# Patient Record
Sex: FEMALE | Employment: FULL TIME | URBAN - METROPOLITAN AREA
[De-identification: names, ages, dates, MRNs, and addresses within clinical notes are randomized per-mention and may not be internally consistent; named-entity substitution may affect disease eponyms.]

---

## 2024-06-21 PROBLEM — O10.919 CHRONIC HYPERTENSION DURING PREGNANCY: Status: ACTIVE | Noted: 2024-06-21

## 2024-06-21 PROBLEM — O09.93 HIGH-RISK PREGNANCY, THIRD TRIMESTER: Status: ACTIVE | Noted: 2024-06-21

## 2024-06-24 PROBLEM — O99.719 PRURITUS OF PREGNANCY: Status: ACTIVE | Noted: 2024-06-24

## 2024-06-24 PROBLEM — L29.9 PRURITUS OF PREGNANCY: Status: ACTIVE | Noted: 2024-06-24

## 2024-06-25 ENCOUNTER — ROUTINE PRENATAL (OUTPATIENT)
Dept: OBGYN CLINIC | Age: 29
End: 2024-06-25
Payer: COMMERCIAL

## 2024-06-25 VITALS
DIASTOLIC BLOOD PRESSURE: 90 MMHG | BODY MASS INDEX: 34.16 KG/M2 | WEIGHT: 174 LBS | HEIGHT: 60 IN | SYSTOLIC BLOOD PRESSURE: 128 MMHG

## 2024-06-25 DIAGNOSIS — O10.919 CHRONIC HYPERTENSION DURING PREGNANCY: ICD-10-CM

## 2024-06-25 DIAGNOSIS — Z67.91 RH D NEGATIVE BLOOD TYPE: ICD-10-CM

## 2024-06-25 DIAGNOSIS — O99.513 ASTHMA AFFECTING PREGNANCY IN THIRD TRIMESTER: ICD-10-CM

## 2024-06-25 DIAGNOSIS — O99.213 OBESITY AFFECTING PREGNANCY IN THIRD TRIMESTER, UNSPECIFIED OBESITY TYPE: ICD-10-CM

## 2024-06-25 DIAGNOSIS — O99.713 PRURITUS OF PREGNANCY IN THIRD TRIMESTER: ICD-10-CM

## 2024-06-25 DIAGNOSIS — L29.9 PRURITUS OF PREGNANCY IN THIRD TRIMESTER: ICD-10-CM

## 2024-06-25 DIAGNOSIS — J45.909 ASTHMA AFFECTING PREGNANCY IN THIRD TRIMESTER: ICD-10-CM

## 2024-06-25 DIAGNOSIS — O09.93 HIGH-RISK PREGNANCY, THIRD TRIMESTER: Primary | ICD-10-CM

## 2024-06-25 DIAGNOSIS — Z3A.30 30 WEEKS GESTATION OF PREGNANCY: ICD-10-CM

## 2024-06-25 PROCEDURE — 99204 OFFICE O/P NEW MOD 45 MIN: CPT | Performed by: OBSTETRICS & GYNECOLOGY

## 2024-06-25 PROCEDURE — 76819 FETAL BIOPHYS PROFIL W/O NST: CPT | Performed by: OBSTETRICS & GYNECOLOGY

## 2024-06-25 PROCEDURE — 76827 ECHO EXAM OF FETAL HEART: CPT | Performed by: OBSTETRICS & GYNECOLOGY

## 2024-06-25 PROCEDURE — 76811 OB US DETAILED SNGL FETUS: CPT | Performed by: OBSTETRICS & GYNECOLOGY

## 2024-06-25 PROCEDURE — 76825 ECHO EXAM OF FETAL HEART: CPT | Performed by: OBSTETRICS & GYNECOLOGY

## 2024-06-25 PROCEDURE — 93325 DOPPLER ECHO COLOR FLOW MAPG: CPT | Performed by: OBSTETRICS & GYNECOLOGY

## 2024-06-25 ASSESSMENT — PATIENT HEALTH QUESTIONNAIRE - PHQ9
SUM OF ALL RESPONSES TO PHQ QUESTIONS 1-9: 0
SUM OF ALL RESPONSES TO PHQ9 QUESTIONS 1 & 2: 0
SUM OF ALL RESPONSES TO PHQ QUESTIONS 1-9: 0
SUM OF ALL RESPONSES TO PHQ QUESTIONS 1-9: 0
2. FEELING DOWN, DEPRESSED OR HOPELESS: NOT AT ALL
1. LITTLE INTEREST OR PLEASURE IN DOING THINGS: NOT AT ALL
SUM OF ALL RESPONSES TO PHQ QUESTIONS 1-9: 0

## 2024-06-25 NOTE — PATIENT INSTRUCTIONS
Resources for Depression/Anxiety  [employer] (PSI).    PSI Warmline:  [phone] (1495).  [email or web address].[email or web address]    Mom's IMPACTT  [email or web address]       In order to optimize maternal, fetal, and  health, we recommend the following vaccinations.   Flu- yearly ([email or web address]://[email or web address]-facts-[email or web address]-[email or web address])  Consider Covid vaccination/booster ([email or web address]://[email or web address]-[email or web address])  TDaP after 28 weeks each pregnancy ([email or web address]://www.[email or web address])  Consider RSV vaccine 32-36 weeks of pregnancy, if Sept- January.  ([email or web address].[email or web address])

## 2024-06-25 NOTE — PROGRESS NOTES
diagnosis further, approximately 11% of women with chronic hypertension have proteinuria at baseline.  Up to 20-50% of women with chronic hypertension may develop superimposed preeclampsia, an incidence five times or more than that of pregnant women without hypertension. There are currently no useful tools for predicting superimposed preeclampsia, but the risk of superimposed preeclampsia is higher in women who are , obese, smoke, have had hypertension for 4 years or more, have a diastolic blood pressure higher than 100 mm Hg at baseline, and have a history of preeclampsia.     A woman with chronic hypertension should be evaluated prepregnancy to identify possible end-organ involvement, to consider evaluation for secondary hyper tension, and for the optimization of maternal comorbidities (eg, obesity, diabetes) before pregnancy. Women with modifiable risk factors, such as obesity and poor glycemic control, may benefit from counseling on weight loss, diet, and lifestyle modifications. Women who have had poorly controlled hypertension for more than 4 years or those suspected of having long-standing hypertension based on age (older than 30 years) are more likely to have cardiac hypertrophic changes, cardiomegaly, and ischemic heart disease and should have ekg, with echocardiogram as appropriate.     Treatment of chronic hypertension, in the absence of clear evidence supporting the use of antihypertensive therapy for lower blood pressures, initiation of antihypertensive therapy is recommended for persistent chronic hypertension when systolic pressure is 140 mm Hg or more, diastolic pressure is 90 mm Hg or more, or both. In the setting of comorbidities or underlying impaired renal function, treating at lower blood pressure thresholds may be appropriate.     Blood pressure in the postpartum period is often higher compared with antepartum levels, particularly in the first 1-2 weeks postpartum. Severe

## 2024-06-25 NOTE — ASSESSMENT & PLAN NOTE
Asthma in pregnancy can be potentially life-threatening to mother and fetus.  Often, asthma will worsen in early third trimester. Recommend close monitoring and preventative therapy and aggressive treatment of exacerbations.      Patients with mild or well-controlled asthma will use an inhaled Beta-agonist (Albuterol type inhaler) prn or every 4-6 hours to control asthma. If patient requires use of rescue inhaler more than 2 times per week, worsening pulmonary function should be treated with the addition of an inhaled corticosteroid (ex. Flovent) BID.    For severe asthma or significant worsening of condition on the first 2 combined medications, patient should be assessed by her primary care physician. However, oral Prednisone 60 mgs daily for 1 week followed by a 10 day taper may be used.      Recommendation to decrease asthma triggers; Optimize seasonal allergy control.      testing twice weekly is recommended for severe asthma beginning at 32 weeks with growth assessment each month.     Avoid hemabate for treatment of uterine atony/pp hemorrhage.    
Low dose Aspirin  mg daily is recommended to be started at 12-16 weeks (some benefit seen with starting up to 28 weeks) for the prevention of preeclampsia  in high risk women. Consider stopping Aspirin at 36 weeks.  Recommend Vitamin D 2000IU daily and Calcium 1000mg daily to aid in protection of bones and teeth.  Recommend use of PNV daily with well-balanced diet.  Unless instructed otherwise, recommend continuation of physical activity throughout pregnancy.       Genetic counseling was performed by physician after reviewing patient's genetic history.    The patient's Down syndrome age associated risk, as well as, risks of additional aneuploidy and genetic syndromes, are reduced by approximately 50% with a normal anatomy ultrasound. Ultrasound alone does not rule out all abnormalities of genetics and development.     Maternal serum screening for aneuploidy was discussed with the patient including first trimester RAMONA-A/hCG, second trimester Quad screen (either in isolation or sequential with RAMONA-A) as well as non-invasive prenatal testing (NIPT) for aneuploidy from a maternal blood sample.  Positive predictive and negative predictive values for these tests were explained, questions answered. Patient understands that these are screening tests that only assesses risk for select abnormalities (trisomies 13, 18, and 21, and sex chromosome abnormalities (NIPT), as well as markers for placental health (RAMONA-A) and risk for open neural tube defects (quad)).  NIPT is designed for high risk populations, but should be considered by all patients who desire the current best option for screening for applicable genetic abnormalities.     Limitations of technology discussed based on maternal age, technical aspects of tests, and maternal BMI reviewed.  All questions answered and concerns discussed.     Patient elected to proceed with NIPT previously at OB office- results low risk.      
Preconception BMI ? 30 increases risk for pregnancy complications, including gestational diabetes, poor or accelerated fetal growth, hypertensive disorders of pregnancy, and abnormal labor progression. In addition, there is an increased risk of fetal demise, as well as congenital anomalies including neural tube defects, cardiac malformations, orofacial defects, and limb reduction abnormalities.     The risk for stillbirth increases with increasing obesity: class I obesity 1.3 [1.2-1.4], class II obesity 1.4 [1.3-1.6], class III obesity 1.9 [1.3-1.6]) and higher stillbirth risk in  obese women (1.9 [1.7-2.1]) than in  obese women (1.4 [1.3-1.5]). Among women with class III obesity (BMI ?40 kg/m2), the risk for stillbirth increased with advancing gestational age: 30 to 33 weeks, hazard and risk ratios 1.40 and 1.69, respectively; 37 to 39 weeks, hazard and risk ratios 3.20 and 2.95, respectively; and 40 to 42 weeks, hazard and risk ratios 3.30 and 8.95, respectively.           Recommend detailed first trimester ultrasound with NT at 12-13 weeks.   Recommend level II ultrasound for anatomy and fetal echo if prepregnancy BMI >30-35 based on AIUM guidelines.   Consider  testing beginning at 32-36 weeks due to risks of fetal demise, timing dependent on maternal and fetal comorbidities.   Early evaluation of insulin resistance with HgbA1c at initiation of care- if a1c > 5.5, then follow up with either \"2 step\" 1hr GCT/ 3hr GTT OR \"1 step\" 2hr GTT  Closely monitor blood pressure for development/worsening of hypertensive disorders of pregnancy.  Weight Gain Goal: <15 pounds, it is ok to stay same weight or lose as long as baby growing well  Dietary choices- low carb fine, avoid extreme keto (goal >50-75gm carb/day); not to use intermittent/prolonged fasting without specific discussion with physician.   Continue activity/exercise     The [employer] 
blood pressure in early pregnancy. Confounding the diagnosis further, approximately 11% of women with chronic hypertension have proteinuria at baseline.  Up to 20-50% of women with chronic hypertension may develop superimposed preeclampsia, an incidence five times or more than that of pregnant women without hypertension. There are currently no useful tools for predicting superimposed preeclampsia, but the risk of superimposed preeclampsia is higher in women who are , obese, smoke, have had hypertension for 4 years or more, have a diastolic blood pressure higher than 100 mm Hg at baseline, and have a history of preeclampsia.     A woman with chronic hypertension should be evaluated prepregnancy to identify possible end-organ involvement, to consider evaluation for secondary hyper tension, and for the optimization of maternal comorbidities (eg, obesity, diabetes) before pregnancy. Women with modifiable risk factors, such as obesity and poor glycemic control, may benefit from counseling on weight loss, diet, and lifestyle modifications. Women who have had poorly controlled hypertension for more than 4 years or those suspected of having long-standing hypertension based on age (older than 30 years) are more likely to have cardiac hypertrophic changes, cardiomegaly, and ischemic heart disease and should have ekg, with echocardiogram as appropriate.     Treatment of chronic hypertension, in the absence of clear evidence supporting the use of antihypertensive therapy for lower blood pressures, initiation of antihypertensive therapy is recommended for persistent chronic hypertension when systolic pressure is 140 mm Hg or more, diastolic pressure is 90 mm Hg or more, or both. In the setting of comorbidities or underlying impaired renal function, treating at lower blood pressure thresholds may be appropriate.     Blood pressure in the postpartum period is often higher compared with antepartum levels,